# Patient Record
Sex: FEMALE | Race: BLACK OR AFRICAN AMERICAN | NOT HISPANIC OR LATINO | URBAN - METROPOLITAN AREA
[De-identification: names, ages, dates, MRNs, and addresses within clinical notes are randomized per-mention and may not be internally consistent; named-entity substitution may affect disease eponyms.]

---

## 2019-12-16 ENCOUNTER — OUTPATIENT (OUTPATIENT)
Dept: OUTPATIENT SERVICES | Facility: HOSPITAL | Age: 29
LOS: 1 days | End: 2019-12-16
Payer: MEDICAID

## 2019-12-16 ENCOUNTER — EMERGENCY (EMERGENCY)
Facility: HOSPITAL | Age: 29
LOS: 1 days | Discharge: STILL ADMITTED OR ADMINISTRATIVE | End: 2019-12-16
Admitting: EMERGENCY MEDICINE

## 2019-12-16 ENCOUNTER — ASOB RESULT (OUTPATIENT)
Age: 29
End: 2019-12-16

## 2019-12-16 ENCOUNTER — APPOINTMENT (OUTPATIENT)
Dept: ANTEPARTUM | Facility: CLINIC | Age: 29
End: 2019-12-16
Payer: MEDICAID

## 2019-12-16 ENCOUNTER — TRANSCRIPTION ENCOUNTER (OUTPATIENT)
Age: 29
End: 2019-12-16

## 2019-12-16 ENCOUNTER — OUTPATIENT (OUTPATIENT)
Dept: OUTPATIENT SERVICES | Facility: HOSPITAL | Age: 29
LOS: 1 days | End: 2019-12-16

## 2019-12-16 VITALS
RESPIRATION RATE: 16 BRPM | OXYGEN SATURATION: 100 % | TEMPERATURE: 98 F | DIASTOLIC BLOOD PRESSURE: 73 MMHG | SYSTOLIC BLOOD PRESSURE: 129 MMHG | HEART RATE: 116 BPM

## 2019-12-16 VITALS — SYSTOLIC BLOOD PRESSURE: 133 MMHG | HEART RATE: 120 BPM | DIASTOLIC BLOOD PRESSURE: 80 MMHG

## 2019-12-16 VITALS — OXYGEN SATURATION: 100 % | HEART RATE: 115 BPM

## 2019-12-16 DIAGNOSIS — Z3A.00 WEEKS OF GESTATION OF PREGNANCY NOT SPECIFIED: ICD-10-CM

## 2019-12-16 DIAGNOSIS — O26.899 OTHER SPECIFIED PREGNANCY RELATED CONDITIONS, UNSPECIFIED TRIMESTER: ICD-10-CM

## 2019-12-16 PROBLEM — Z00.00 ENCOUNTER FOR PREVENTIVE HEALTH EXAMINATION: Status: ACTIVE | Noted: 2019-12-16

## 2019-12-16 LAB
ALBUMIN SERPL ELPH-MCNC: 3.6 G/DL — SIGNIFICANT CHANGE UP (ref 3.3–5)
ALP SERPL-CCNC: 195 U/L — HIGH (ref 40–120)
ALT FLD-CCNC: 15 U/L — SIGNIFICANT CHANGE UP (ref 4–33)
AMPHET UR-MCNC: NEGATIVE — SIGNIFICANT CHANGE UP
ANION GAP SERPL CALC-SCNC: 12 MMO/L — SIGNIFICANT CHANGE UP (ref 7–14)
ANISOCYTOSIS BLD QL: SLIGHT — SIGNIFICANT CHANGE UP
APPEARANCE UR: CLEAR — SIGNIFICANT CHANGE UP
APTT BLD: 25.5 SEC — LOW (ref 27.5–36.3)
AST SERPL-CCNC: 19 U/L — SIGNIFICANT CHANGE UP (ref 4–32)
BACTERIA # UR AUTO: NEGATIVE — SIGNIFICANT CHANGE UP
BARBITURATES UR SCN-MCNC: NEGATIVE — SIGNIFICANT CHANGE UP
BASOPHILS # BLD AUTO: 0.1 K/UL — SIGNIFICANT CHANGE UP (ref 0–0.2)
BASOPHILS NFR BLD AUTO: 0.9 % — SIGNIFICANT CHANGE UP (ref 0–2)
BASOPHILS NFR SPEC: 0 % — SIGNIFICANT CHANGE UP (ref 0–2)
BENZODIAZ UR-MCNC: NEGATIVE — SIGNIFICANT CHANGE UP
BILIRUB SERPL-MCNC: 0.2 MG/DL — SIGNIFICANT CHANGE UP (ref 0.2–1.2)
BILIRUB UR-MCNC: NEGATIVE — SIGNIFICANT CHANGE UP
BLASTS # FLD: 0 % — SIGNIFICANT CHANGE UP (ref 0–0)
BLD GP AB SCN SERPL QL: NEGATIVE — SIGNIFICANT CHANGE UP
BLOOD UR QL VISUAL: NEGATIVE — SIGNIFICANT CHANGE UP
BUN SERPL-MCNC: 5 MG/DL — LOW (ref 7–23)
CALCIUM SERPL-MCNC: 9.8 MG/DL — SIGNIFICANT CHANGE UP (ref 8.4–10.5)
CANNABINOIDS UR-MCNC: NEGATIVE — SIGNIFICANT CHANGE UP
CHLORIDE SERPL-SCNC: 104 MMOL/L — SIGNIFICANT CHANGE UP (ref 98–107)
CO2 SERPL-SCNC: 20 MMOL/L — LOW (ref 22–31)
COCAINE METAB.OTHER UR-MCNC: NEGATIVE — SIGNIFICANT CHANGE UP
COLOR SPEC: COLORLESS — SIGNIFICANT CHANGE UP
CREAT ?TM UR-MCNC: 33 MG/DL — SIGNIFICANT CHANGE UP
CREAT SERPL-MCNC: 0.6 MG/DL — SIGNIFICANT CHANGE UP (ref 0.5–1.3)
EOSINOPHIL # BLD AUTO: 0.2 K/UL — SIGNIFICANT CHANGE UP (ref 0–0.5)
EOSINOPHIL NFR BLD AUTO: 1.7 % — SIGNIFICANT CHANGE UP (ref 0–6)
EOSINOPHIL NFR FLD: 2.7 % — SIGNIFICANT CHANGE UP (ref 0–6)
FIBRINOGEN PPP-MCNC: 503.3 MG/DL — SIGNIFICANT CHANGE UP (ref 350–510)
GIANT PLATELETS BLD QL SMEAR: PRESENT — SIGNIFICANT CHANGE UP
GLUCOSE SERPL-MCNC: 97 MG/DL — SIGNIFICANT CHANGE UP (ref 70–99)
GLUCOSE UR-MCNC: NEGATIVE — SIGNIFICANT CHANGE UP
HAV IGM SER-ACNC: NONREACTIVE — SIGNIFICANT CHANGE UP
HBV CORE IGM SER-ACNC: NONREACTIVE — SIGNIFICANT CHANGE UP
HBV SURFACE AG SER-ACNC: NONREACTIVE — SIGNIFICANT CHANGE UP
HCT VFR BLD CALC: 36.9 % — SIGNIFICANT CHANGE UP (ref 34.5–45)
HCV AB S/CO SERPL IA: 0.12 S/CO — SIGNIFICANT CHANGE UP (ref 0–0.99)
HCV AB SERPL-IMP: SIGNIFICANT CHANGE UP
HGB BLD-MCNC: 11.7 G/DL — SIGNIFICANT CHANGE UP (ref 11.5–15.5)
HIV COMBO RESULT: SIGNIFICANT CHANGE UP
HIV1+2 AB SPEC QL: SIGNIFICANT CHANGE UP
HYALINE CASTS # UR AUTO: NEGATIVE — SIGNIFICANT CHANGE UP
IMM GRANULOCYTES NFR BLD AUTO: 5.2 % — HIGH (ref 0–1.5)
INR BLD: 1.09 — SIGNIFICANT CHANGE UP (ref 0.88–1.17)
KETONES UR-MCNC: NEGATIVE — SIGNIFICANT CHANGE UP
LDH SERPL L TO P-CCNC: 202 U/L — SIGNIFICANT CHANGE UP (ref 135–225)
LEUKOCYTE ESTERASE UR-ACNC: SIGNIFICANT CHANGE UP
LYMPHOCYTES # BLD AUTO: 2.66 K/UL — SIGNIFICANT CHANGE UP (ref 1–3.3)
LYMPHOCYTES # BLD AUTO: 22.9 % — SIGNIFICANT CHANGE UP (ref 13–44)
LYMPHOCYTES NFR SPEC AUTO: 10.7 % — LOW (ref 13–44)
MACROCYTES BLD QL: SLIGHT — SIGNIFICANT CHANGE UP
MCHC RBC-ENTMCNC: 28.3 PG — SIGNIFICANT CHANGE UP (ref 27–34)
MCHC RBC-ENTMCNC: 31.7 % — LOW (ref 32–36)
MCV RBC AUTO: 89.1 FL — SIGNIFICANT CHANGE UP (ref 80–100)
METAMYELOCYTES # FLD: 0 % — SIGNIFICANT CHANGE UP (ref 0–1)
METHADONE UR-MCNC: NEGATIVE — SIGNIFICANT CHANGE UP
MONOCYTES # BLD AUTO: 0.99 K/UL — HIGH (ref 0–0.9)
MONOCYTES NFR BLD AUTO: 8.5 % — SIGNIFICANT CHANGE UP (ref 2–14)
MONOCYTES NFR BLD: 5.4 % — SIGNIFICANT CHANGE UP (ref 2–9)
MYELOCYTES NFR BLD: 0 % — SIGNIFICANT CHANGE UP (ref 0–0)
NEUTROPHIL AB SER-ACNC: 72.3 % — SIGNIFICANT CHANGE UP (ref 43–77)
NEUTROPHILS # BLD AUTO: 7.08 K/UL — SIGNIFICANT CHANGE UP (ref 1.8–7.4)
NEUTROPHILS NFR BLD AUTO: 60.8 % — SIGNIFICANT CHANGE UP (ref 43–77)
NEUTS BAND # BLD: 0.9 % — SIGNIFICANT CHANGE UP (ref 0–6)
NITRITE UR-MCNC: NEGATIVE — SIGNIFICANT CHANGE UP
NRBC # FLD: 0 K/UL — SIGNIFICANT CHANGE UP (ref 0–0)
OPIATES UR-MCNC: NEGATIVE — SIGNIFICANT CHANGE UP
OTHER - HEMATOLOGY %: 0 — SIGNIFICANT CHANGE UP
OVALOCYTES BLD QL SMEAR: SLIGHT — SIGNIFICANT CHANGE UP
OXYCODONE UR-MCNC: NEGATIVE — SIGNIFICANT CHANGE UP
PCP UR-MCNC: NEGATIVE — SIGNIFICANT CHANGE UP
PH UR: 7 — SIGNIFICANT CHANGE UP (ref 5–8)
PLATELET # BLD AUTO: 152 K/UL — SIGNIFICANT CHANGE UP (ref 150–400)
PLATELET COUNT - ESTIMATE: NORMAL — SIGNIFICANT CHANGE UP
PMV BLD: 12.6 FL — SIGNIFICANT CHANGE UP (ref 7–13)
POIKILOCYTOSIS BLD QL AUTO: SLIGHT — SIGNIFICANT CHANGE UP
POTASSIUM SERPL-MCNC: 4.4 MMOL/L — SIGNIFICANT CHANGE UP (ref 3.5–5.3)
POTASSIUM SERPL-SCNC: 4.4 MMOL/L — SIGNIFICANT CHANGE UP (ref 3.5–5.3)
PROMYELOCYTES # FLD: 0 % — SIGNIFICANT CHANGE UP (ref 0–0)
PROT SERPL-MCNC: 6.8 G/DL — SIGNIFICANT CHANGE UP (ref 6–8.3)
PROT UR-MCNC: 6.9 MG/DL — SIGNIFICANT CHANGE UP
PROT UR-MCNC: NEGATIVE — SIGNIFICANT CHANGE UP
PROTHROM AB SERPL-ACNC: 12.1 SEC — SIGNIFICANT CHANGE UP (ref 9.8–13.1)
RBC # BLD: 4.14 M/UL — SIGNIFICANT CHANGE UP (ref 3.8–5.2)
RBC # FLD: 13.3 % — SIGNIFICANT CHANGE UP (ref 10.3–14.5)
RBC CASTS # UR COMP ASSIST: SIGNIFICANT CHANGE UP (ref 0–?)
RH IG SCN BLD-IMP: NEGATIVE — SIGNIFICANT CHANGE UP
RH IG SCN BLD-IMP: NEGATIVE — SIGNIFICANT CHANGE UP
RUBV IGG SER-ACNC: 2.2 INDEX — SIGNIFICANT CHANGE UP
RUBV IGG SER-IMP: POSITIVE — SIGNIFICANT CHANGE UP
SMUDGE CELLS # BLD: PRESENT — SIGNIFICANT CHANGE UP
SODIUM SERPL-SCNC: 136 MMOL/L — SIGNIFICANT CHANGE UP (ref 135–145)
SP GR SPEC: 1.01 — SIGNIFICANT CHANGE UP (ref 1–1.04)
SQUAMOUS # UR AUTO: SIGNIFICANT CHANGE UP
T PALLIDUM AB TITR SER: NEGATIVE — SIGNIFICANT CHANGE UP
URATE SERPL-MCNC: 3.6 MG/DL — SIGNIFICANT CHANGE UP (ref 2.5–7)
UROBILINOGEN FLD QL: NORMAL — SIGNIFICANT CHANGE UP
VARIANT LYMPHS # BLD: 8 % — SIGNIFICANT CHANGE UP
WBC # BLD: 11.63 K/UL — HIGH (ref 3.8–10.5)
WBC # FLD AUTO: 11.63 K/UL — HIGH (ref 3.8–10.5)
WBC UR QL: SIGNIFICANT CHANGE UP (ref 0–?)

## 2019-12-16 PROCEDURE — 76811 OB US DETAILED SNGL FETUS: CPT | Mod: 26

## 2019-12-16 PROCEDURE — 76819 FETAL BIOPHYS PROFIL W/O NST: CPT | Mod: 26

## 2019-12-16 PROCEDURE — 93010 ELECTROCARDIOGRAM REPORT: CPT

## 2019-12-16 RX ORDER — METOCLOPRAMIDE HCL 10 MG
10 TABLET ORAL ONCE
Refills: 0 | Status: DISCONTINUED | OUTPATIENT
Start: 2019-12-16 | End: 2019-12-16

## 2019-12-16 RX ORDER — SODIUM CHLORIDE 9 MG/ML
1000 INJECTION, SOLUTION INTRAVENOUS ONCE
Refills: 0 | Status: COMPLETED | OUTPATIENT
Start: 2019-12-16 | End: 2019-12-16

## 2019-12-16 RX ORDER — ACETAMINOPHEN 500 MG
975 TABLET ORAL ONCE
Refills: 0 | Status: COMPLETED | OUTPATIENT
Start: 2019-12-16 | End: 2019-12-16

## 2019-12-16 RX ADMIN — Medication 975 MILLIGRAM(S): at 04:03

## 2019-12-16 RX ADMIN — SODIUM CHLORIDE 2000 MILLILITER(S): 9 INJECTION, SOLUTION INTRAVENOUS at 04:19

## 2019-12-16 RX ADMIN — Medication 975 MILLIGRAM(S): at 07:28

## 2019-12-16 NOTE — OB PROVIDER TRIAGE NOTE - NSHPPHYSICALEXAM_GEN_ALL_CORE
30 y/o AA female, para 1001 @ 39+2 wks gestation, SIUP.  Gen: NAD, A&O x3  Lungs: CTA  Cardiac: Sinus tachycardia otherwise normal rhythm  DTRs 2+ 30 y/o AA female, para 1001 @ 39+2 wks gestation, SIUP.  Gen: NAD, A&O x3  Lungs: CTA  Cardiac: Sinus tachycardia otherwise normal rhythm  DTRs 2+  VS--BP: 128/76, P 107, RR 18, T 37.4, pain 3/10.  BP trends: 133/80, 128/76, 115/60, 113/56  Cat 1 tracin bpm, moderate variability, +accels, no decels  [place]: Irreg. q 2-9 min.  SVE: 0/20/-3, intact membranes  GBS swab obtained and sent.  Limited TAS: SLIUP, Cephalic, posterior placenta, BPP 8/8, SHELBIE 13.86 cm, EFW AGA-3370g  Plan:  PEC labs  Prenatal panel  GBS 28 y/o AA female, para 1001 @ 39+2 wks gestation, SIUP.  Gen: NAD, A&O x3  Lungs: CTA  Cardiac: Sinus tachycardia otherwise normal rhythm  DTRs 2+  VS--BP: 128/76, P 107, RR 18, T 37.4, pain 3/10.  BP trends: 133/80, 128/76, 115/60, 113/56  Cat 1 tracin bpm, moderate variability, +accels, no decels  [place]: Irreg. q 2-9 min.  SVE: 0/20/-3, intact membranes  GBS swab obtained and sent.  Limited TAS: SLIUP, Cephalic, posterior placenta, BPP 8/8, SHELBIE 13.86 cm, EFW AGA-3370g  Plan:  PEC labs  EKG   Prenatal panel  GBS  U. Tox 30 y/o AA female, para 1001 @ 39+2 wks gestation, SIUP.  Gen: NAD, A&O x3  Lungs: CTA  Cardiac: Sinus tachycardia otherwise normal rhythm  DTRs 2+  VS--BP: 128/76, P 107, RR 18, T 37.4, pain 3/10.  BP trends: 133/80, 128/76, 115/60, 113/56  Cat 1 tracin bpm, moderate variability, +accels, no decels  [place]: Irreg. q 2-9 min.  SVE: 0/20/-3, intact membranes  GBS swab obtained and sent.  Limited TAS: SLIUP, Cephalic, posterior placenta, BPP 8/8, SHELBIE 13.86 cm, EFW AGA-3370g  Plan:  PEC labs  EKG   Prenatal panel  GBS  U. Tox    headache 9/10, now requesting pain meds @ 9947   tylenol 975  IV fluid bolus  s/p EKG : normal sinus tachycardia  TOCO: ctx irregular  NST: , +accels, -decels, moderate variability   Case Discussed with Dr. Dang    @ 3618  s/p tylenol  s/p IV bolus  CAt 1 tracing  TOCo: ctx q 4-14 min, mild  Headache 3/10  VE: L/C/P, intact  Dr. Dang at bedside

## 2019-12-16 NOTE — DISCHARGE NOTE ANTEPARTUM - PROVIDER TOKENS
FREE:[LAST:[[facility]],PHONE:[phone]],FAX:[(   [phone]    -],ADDRESS:[[address] [place] [address] [facility]  [address] [place] [address]]]

## 2019-12-16 NOTE — OB PROVIDER TRIAGE NOTE - NSOBPROVIDERNOTE_OBGYN_ALL_OB_FT
s/p EKG : normal sinus tachycardia  TOCO: ctx irregular  NST: , +accels, -decels, moderate variability headache 5/10, now requesting pain meds @ 2662   s/p EKG : normal sinus tachycardia  TOCO: ctx irregular  NST: , +accels, -decels, moderate variability headache 9/10, now requesting pain meds @ 9524   s/p EKG : normal sinus tachycardia  TOCO: ctx irregular  NST: , +accels, -decels, moderate variability headache 9/10, now requesting pain meds @ 4582   tylenol 975  IV fluid bolus  s/p EKG : normal sinus tachycardia  TOCO: ctx irregular  NST: , +accels, -decels, moderate variability   Case Discussed with Dr. Dang headache 9/10, now requesting pain meds @ 8979   tylenol 975  IV fluid bolus  s/p EKG : normal sinus tachycardia  TOCO: ctx irregular  NST: , +accels, -decels, moderate variability   Case Discussed with Dr. Dang    @ 5536  s/p tylenol  s/p IV bolus  CAt 1 tracing  TOCo: ctx q 4-14 min, mild  Headache 3/10  VE: L/C/P, intact  Dr. Dang at bedside headache 9/10, now requesting pain meds @ 3533   tylenol 975  IV fluid bolus  s/p EKG : normal sinus tachycardia  TOCO: ctx irregular  NST: , +accels, -decels, moderate variability   Case Discussed with Dr. Dang    @ 7608  s/p tylenol  s/p IV bolus  CAt 1 tracing  TOCo: ctx q 4-14 min, mild  Headache 3/10  VE: L/C/P, intact  Dr. Dang at bedside    @ 0540  /77  Patient now reporting hx of persistent headache for past 3 wks  Patient admitted for observation  MFM consult in AM  rx Reglan ordered  Discussed with Dr. Dang/Ravindra/Krystle

## 2019-12-16 NOTE — ED ADULT TRIAGE NOTE - CHIEF COMPLAINT QUOTE
pt 39 wks pregnant with c/o contractions every 20-30 mins. pt also c/o headache and hands numb. BARB Dec 21st, pt denies any vaginal bleeding pt states . SPoke w/ L&D pt to been seen in L&D.

## 2019-12-16 NOTE — DISCHARGE NOTE ANTEPARTUM - MEDICATION SUMMARY - MEDICATIONS TO TAKE
I will START or STAY ON the medications listed below when I get home from the hospital:    Prena1 oral capsule  -- 1 cap(s) by mouth once a day  -- Indication: For Other pregnancy-related conditions, antepartum

## 2019-12-16 NOTE — DISCHARGE NOTE ANTEPARTUM - PLAN OF CARE
r/o PEC - Follow up with OB clinic at [facility] on Wednesday/ Thursday (12/18-12/19) they will call you to set up appointment date and time  - Continue 24 hour urine collection and bring to follow up appointment at [facility] on Wednesday/Thursday  - Monitor Blood Pressure at home; if BP >140/90 please call clinic [phone]/ return   - Come to hospital with headache, visual changes, RUQ pain, N/V, or elevated BPs >140/90

## 2019-12-16 NOTE — DISCHARGE NOTE ANTEPARTUM - CARE PROVIDER_API CALL
[facility],   3rd floor Oncology Building  [address] [place] [address]  Phone: [phone]  Fax: (   )    -  Follow Up Time:

## 2019-12-16 NOTE — OB PROVIDER TRIAGE NOTE - HISTORY OF PRESENT ILLNESS
[facility] Default patient.  Pt visiting from [facility]. Reports PNC up until November. [place], and wishes to deliver at this facility.  30 y/o AA female, para 1001 @ 39+2 wks gestation, SIUP.  Presents with c/o throbbing right temporal headache x2 days. Pt denies any change in vision (blurry, scotoma), SOB, N&V, neck pain, fever, chills. Pt denies any elevated blood pressures this pregnancy or prior to pregnancy. Pt also with c/o irregular ctx q 15-20 min.  Pt endorses strong fetal movement, no leakage of fluid, no vaginal bleeding. Unknown GBS. Per prenatal card patient is O-negative blood, pt denies receiving Rhogam this pregnancy.     Allergies: NKDA  Meds: Prenatal vitamins  OBgynHx    2010-Uncomplicated  @ 40+6 wks. Female, 6.2#  Pt denies any h/o: Abn. PAP, ovarian cysts, fibroids, STDs/STIs  PMH: Denies  PSH: Denies  PSY: Denies  EtOH/smoke/Recreational substance use: denies  FH: Not relevant to HPI  H/W/BMI: 60"/174#/34

## 2019-12-16 NOTE — CHART NOTE - NSCHARTNOTEFT_GEN_A_CORE
Attending Note     Went to evaluate patient    at 39 + weeks who received in PNC in [place] (last appt as per in 2019, as per clinic in 10/2019) came in for headache x 2 days   did not take any medications at home  also  reports worsening swelling in hands that fingers are numb  no vision changes, RUQ or epigastric pain   also reporting intermittent painful contractions   SVE unchanged over 2 exams closed  Received tylenol for headache 9/10 and now 3/10 dull headache  HELLP labs WNL   Will give reglan, pt has had one bp 140/77   IF pt  has persistent headache or bps meet criteria for gestational HTN will induce  In addition, pt needs formal ATU scan in AM (has hx of 6 pounds delivery at 40 + weeks, as per pt concern about growth of this baby)       Della Rose Md MSc

## 2019-12-16 NOTE — DISCHARGE NOTE ANTEPARTUM - PATIENT PORTAL LINK FT
You can access the FollowMyHealth Patient Portal offered by [facility] by registering at the following website: [email or web address]. By joining [employer]’s FollowMyHealth portal, you will also be able to view your health information using other applications (apps) compatible with our system.

## 2019-12-16 NOTE — CHART NOTE - NSCHARTNOTEFT_GEN_A_CORE
Patient is 29y  P1 at 39w3d who presented to triage with complaint of HA and irregular contractions.     Initially GARCÍA was throbbing 4/10 right temporal HA for which patient declined medication. While in triage patient had worsening of HA to 9/10 severity which is now a bilateral frontal HA. Patient denies h/o headaches/migraines, vision changes, nausea/vomiting, epigastric/RUQ pain, elevated BPs in the pregnancy or prior to pregnancy.     While in triage patient found to have tachycardia up to 127bpm. EKG completed demonstrated sinus tachycardia. Patient denies CP, palpitations, racing heart, sob, dizziness/lightheadedness, fever/chills, sick contacts, dysuria, cough or URI symptoms.     Patient also complains of increasing contractions which are now every 5-10min and have increased in severity to 4-5/10. +FM. Denies LOF, VB. prenatal course uncomplicated per patient and she had regular prenatal care in [place]. Patient has not established care in the  and plans to pursue care at [facility] in order to deliver at [facility]. Per pt she has h/o uncomplicated FT VD '10, 6#2      Vital Signs Last 24 Hrs  T(C): 37.4 (16 Dec 2019 02:08), Max: 37.4 (16 Dec 2019 02:08)  T(F): 99.3 (16 Dec 2019 02:08), Max: 99.3 (16 Dec 2019 02:08)  HR: 115 (16 Dec 2019 04:14) (101 - 127)  BP: 134/72 (16 Dec 2019 04:02) (113/56 - 134/72)  BP(mean): --  RR: 18 (16 Dec 2019 02:08) (16 - 18)  SpO2: 100% (16 Dec 2019 04:14) (99% - 100%)    I&O's Detail      PE:  Gen: Appears comfortable with mild distress from pain with contractions   CV: tachycardic, s1s2  Pulm: CTA b/l  Abd: Soft, gravid, non-tender, no rebound  Ext: NT bilateral; negative Grady's bilaterally  Neuro: CN 2-12 grossly intact       Labs:                        11.7   11.63 )-----------( 152      ( 16 Dec 2019 02:45 )             36.9     12-16    136  |  104  |  5<L>  ----------------------------<  97  4.4   |  20<L>  |  0.60    Ca    9.8      16 Dec 2019 02:45    TPro  6.8  /  Alb  3.6  /  TBili  0.2  /  DBili  x   /  AST  19  /  ALT  15  /  AlkPhos  195<H>  12-16    PT/INR - ( 16 Dec 2019 02:45 )   PT: 12.1 SEC;   INR: 1.09          PTT - ( 16 Dec 2019 02:45 )  PTT:25.5 SEC    Fibrinogen: Fibrinogen Assay: 503.3 mg/dL ( @ 02:45)      Lactate:     MEDICATIONS  (STANDING):  lactated ringers Bolus 1000 milliLiter(s) IV Bolus once      Assessment/Plan: 30yo  at 39w3d p/w HA r/o preeclampsia, tachycardia, and contractions r/o labor.  #HA   -Tylenol for symptom relief  -no elevated BPs, HELLP labs wnl, PC wnl  -reevaluate for symptom relief after Tylenol     #Tachycardia   -EKG: sinus tachycardia   -pt asymptomatic and denies h/o CV disease. no s/s of infection  -IV hydration with 1L LR    #r/o labor   -closed on vaginal exam  -c/w toco/EFM and recheck to evaluate for cervical change     d/w Dr. Reza Dang PGY3  -------------------------------------------------------------------------------------------------------------

## 2019-12-16 NOTE — OB PROVIDER H&P - NSHPPHYSICALEXAM_GEN_ALL_CORE
30 y/o AA female, para 1001 @ 39+2 wks gestation, SIUP.  Gen: NAD, A&O x3  Lungs: CTA  Cardiac: Sinus tachycardia otherwise normal rhythm  DTRs 2+  VS--BP: 128/76, P 107, RR 18, T 37.4, pain 3/10.  BP trends: 133/80, 128/76, 115/60, 113/56  Cat 1 tracin bpm, moderate variability, +accels, no decels  [place]: Irreg. q 2-9 min.  SVE: 0/20/-3, intact membranes  GBS swab obtained and sent.  Limited TAS: SLIUP, Cephalic, posterior placenta, BPP 8/8, SHELBIE 13.86 cm, EFW AGA-3370g  Plan:  PEC labs  EKG   Prenatal panel  GBS  U. Tox    headache 9/10, now requesting pain meds @ 9671   tylenol 975  IV fluid bolus  s/p EKG : normal sinus tachycardia  TOCO: ctx irregular  NST: , +accels, -decels, moderate variability   Case Discussed with Dr. Dang    @ 5945  s/p tylenol  s/p IV bolus  CAt 1 tracing  TOCo: ctx q 4-14 min, mild  Headache 3/10  VE: L/C/P, intact  Dr. Dang at bedside

## 2019-12-16 NOTE — OB PROVIDER TRIAGE NOTE - NSHPLABSRESULTS_GEN_ALL_CORE
11.7   11.63 )-----------( 152      ( 16 Dec 2019 02:45 )             36.9         136  |  104  |  5<L>  ----------------------------<  97  4.4   |  20<L>  |  0.60    Ca    9.8      16 Dec 2019 02:45    TPro  6.8  /  Alb  3.6  /  TBili  0.2  /  DBili  x   /  AST  19  /  ALT  15  /  AlkPhos  195<H>            Urinalysis Basic - ( 16 Dec 2019 02:45 )    Color: COLORLESS / Appearance: CLEAR / S.007 / pH: 7.0  Gluc: NEGATIVE / Ketone: NEGATIVE  / Bili: NEGATIVE / Urobili: NORMAL   Blood: NEGATIVE / Protein: NEGATIVE / Nitrite: NEGATIVE   Leuk Esterase: SMALL / RBC: 0-2 / WBC 3-5   Sq Epi: OCC / Non Sq Epi: x / Bacteria: NEGATIVE      PT/INR - ( 16 Dec 2019 02:45 )   PT: 12.1 SEC;   INR: 1.09          PTT - ( 16 Dec 2019 02:45 )  PTT:25.5 SEC      protein  Random Urine: 6.9 mg/dL (19 @ 02:45)    P/c ratio : 0.2

## 2019-12-16 NOTE — DISCHARGE NOTE ANTEPARTUM - CARE PLAN
Principal Discharge DX:	Headache  Goal:	r/o PEC  Assessment and plan of treatment:	- Follow up with OB clinic at [facility] on Wednesday/ Thursday (12/18-12/19) they will call you to set up appointment date and time  - Continue 24 hour urine collection and bring to follow up appointment at [facility] on Wednesday/Thursday  - Monitor Blood Pressure at home; if BP >140/90 please call clinic [phone]/ return   - Come to hospital with headache, visual changes, RUQ pain, N/V, or elevated BPs >140/90

## 2019-12-16 NOTE — OB PROVIDER H&P - HISTORY OF PRESENT ILLNESS
[facility] Default patient.  Pt visiting from [facility]. Reports PNC up until November. [place], and wishes to deliver at this facility.  28 y/o AA female, para 1001 @ 39+2 wks gestation, SIUP.  Presents with c/o throbbing right temporal headache x2 days. Pt denies any change in vision (blurry, scotoma), SOB, N&V, neck pain, fever, chills. Pt denies any elevated blood pressures this pregnancy or prior to pregnancy. Pt also with c/o irregular ctx q 15-20 min.  Pt endorses strong fetal movement, no leakage of fluid, no vaginal bleeding. Unknown GBS. Per prenatal card patient is O-negative blood, pt denies receiving Rhogam this pregnancy.     Allergies: NKDA  Meds: Prenatal vitamins  OBgynHx    2010-Uncomplicated  @ 40+6 wks. Female, 6.2#  Pt denies any h/o: Abn. PAP, ovarian cysts, fibroids, STDs/STIs  PMH: Denies  PSH: Denies  PSY: Denies  EtOH/smoke/Recreational substance use: denies  FH: Not relevant to HPI  H/W/BMI: 60"/174#/34

## 2019-12-16 NOTE — DISCHARGE NOTE ANTEPARTUM - HOSPITAL COURSE
28 y/o  at 39+2 wks gestation, SIUP. Presents with c/o throbbing right temporal headache x2 days. Pt denies any change in vision (blurry, scotoma), SOB, N&V, neck pain, fever, chills. Pt denies any elevated blood pressures this pregnancy or prior to pregnancy. Pt also with c/o irregular ctx q 15-20 min. Pt endorses strong fetal movement, no leakage of fluid, no vaginal bleeding. Patient was monitored found to have a closed cervix and NL range BPs, HELLP labs WNL, PC ratio 0.2. Patient is stable for discharge and to follow up outpatient with [facility]; email sent for appointment on Wednesday / Thursday. Patient is to continue BP monitoring at home and start 24 hour urine protein collection to bring to clinic appointment.

## 2019-12-16 NOTE — OB PROVIDER H&P - ASSESSMENT
28 y/o AA female, para 1001 @ 39+2 wks gestation, SIUP.  Gen: NAD, A&O x3  Lungs: CTA  Cardiac: Sinus tachycardia otherwise normal rhythm  DTRs 2+  VS--BP: 128/76, P 107, RR 18, T 37.4, pain 3/10.  BP trends: 133/80, 128/76, 115/60, 113/56  Cat 1 tracin bpm, moderate variability, +accels, no decels  [place]: Irreg. q 2-9 min.  SVE: 0/20/-3, intact membranes  GBS swab obtained and sent.  Limited TAS: SLIUP, Cephalic, posterior placenta, BPP 8/8, SHELBIE 13.86 cm, EFW AGA-3370g  Plan:  PEC labs  EKG   Prenatal panel  GBS  U. Tox    headache 9/10, now requesting pain meds @ 0357   tylenol 975  IV fluid bolus  s/p EKG : normal sinus tachycardia  TOCO: ctx irregular  NST: , +accels, -decels, moderate variability   Case Discussed with Dr. Dang    @ 0518  s/p tylenol  s/p IV bolus  CAt 1 tracing  TOCo: ctx q 4-14 min, mild  Headache 3/10  VE: L/C/P, intact  Dr. Dang at bedside    @ 0540  /77  Patient now reporting hx of persistent headache for past 3 wks  Patient admitted for observation  MFM consult in AM  rx Reglan ordered  Discussed with Dr. Dang/Ravindra/Krystle

## 2019-12-16 NOTE — OB PROVIDER TRIAGE NOTE - NS_OBGYNHISTORY_OBGYN_ALL_OB_FT
2010-Uncomplicated  @ 40+6 wks. Female, 6.2#  Pt denies any h/o: Abn. PAP, ovarian cysts, fibroids, STDs/STIs

## 2019-12-17 LAB
C TRACH RRNA SPEC QL NAA+PROBE: SIGNIFICANT CHANGE UP
N GONORRHOEA RRNA SPEC QL NAA+PROBE: SIGNIFICANT CHANGE UP
SPECIMEN SOURCE: SIGNIFICANT CHANGE UP

## 2019-12-18 DIAGNOSIS — Z04.9 ENCOUNTER FOR EXAMINATION AND OBSERVATION FOR UNSPECIFIED REASON: ICD-10-CM

## 2019-12-18 LAB — GP B STREP GENITAL QL CULT: SIGNIFICANT CHANGE UP

## 2019-12-27 ENCOUNTER — APPOINTMENT (OUTPATIENT)
Dept: OBGYN | Facility: CLINIC | Age: 29
End: 2019-12-27

## 2019-12-27 ENCOUNTER — RESULT REVIEW (OUTPATIENT)
Age: 29
End: 2019-12-27

## 2019-12-27 ENCOUNTER — INPATIENT (INPATIENT)
Facility: HOSPITAL | Age: 29
LOS: 3 days | Discharge: HOME | End: 2019-12-31
Attending: OBSTETRICS & GYNECOLOGY | Admitting: OBSTETRICS & GYNECOLOGY
Payer: MEDICAID

## 2019-12-27 ENCOUNTER — TRANSCRIPTION ENCOUNTER (OUTPATIENT)
Age: 29
End: 2019-12-27

## 2019-12-27 VITALS
WEIGHT: 181 LBS | BODY MASS INDEX: 35.53 KG/M2 | RESPIRATION RATE: 18 BRPM | OXYGEN SATURATION: 99 % | TEMPERATURE: 98.4 F | HEART RATE: 114 BPM | HEIGHT: 60 IN | DIASTOLIC BLOOD PRESSURE: 71 MMHG | SYSTOLIC BLOOD PRESSURE: 117 MMHG

## 2019-12-27 VITALS — WEIGHT: 152.12 LBS | HEIGHT: 60 IN

## 2019-12-27 DIAGNOSIS — Z78.9 OTHER SPECIFIED HEALTH STATUS: ICD-10-CM

## 2019-12-27 DIAGNOSIS — Z34.80 ENCOUNTER FOR SUPERVISION OF OTHER NORMAL PREGNANCY, UNSPECIFIED TRIMESTER: ICD-10-CM

## 2019-12-27 DIAGNOSIS — Z82.5 FAMILY HISTORY OF ASTHMA AND OTHER CHRONIC LOWER RESPIRATORY DISEASES: ICD-10-CM

## 2019-12-27 DIAGNOSIS — Z87.09 PERSONAL HISTORY OF OTHER DISEASES OF THE RESPIRATORY SYSTEM: ICD-10-CM

## 2019-12-27 DIAGNOSIS — Z3A.00 WEEKS OF GESTATION OF PREGNANCY NOT SPECIFIED: ICD-10-CM

## 2019-12-27 DIAGNOSIS — Z80.41 FAMILY HISTORY OF MALIGNANT NEOPLASM OF OVARY: ICD-10-CM

## 2019-12-27 DIAGNOSIS — O26.899 OTHER SPECIFIED PREGNANCY RELATED CONDITIONS, UNSPECIFIED TRIMESTER: ICD-10-CM

## 2019-12-27 LAB
ABO RH CONFIRMATION: SIGNIFICANT CHANGE UP
ALLERGY+IMMUNOLOGY DIAG STUDY NOTE: SIGNIFICANT CHANGE UP
APTT BLD: 25.1 SEC — LOW (ref 27.5–36.3)
BASOPHILS # BLD AUTO: 0.05 K/UL — SIGNIFICANT CHANGE UP (ref 0–0.2)
BASOPHILS NFR BLD AUTO: 0.5 % — SIGNIFICANT CHANGE UP (ref 0–2)
BLD GP AB SCN SERPL QL: SIGNIFICANT CHANGE UP
EOSINOPHIL # BLD AUTO: 0.07 K/UL — SIGNIFICANT CHANGE UP (ref 0–0.5)
EOSINOPHIL NFR BLD AUTO: 0.7 % — SIGNIFICANT CHANGE UP (ref 0–6)
FIBRINOGEN PPP-MCNC: 553 MG/DL — HIGH (ref 350–510)
HCT VFR BLD CALC: 39.1 % — SIGNIFICANT CHANGE UP (ref 34.5–45)
HGB BLD-MCNC: 12.5 G/DL — SIGNIFICANT CHANGE UP (ref 11.5–15.5)
IMM GRANULOCYTES NFR BLD AUTO: 3.6 % — HIGH (ref 0–1.5)
INR BLD: 1.11 RATIO — SIGNIFICANT CHANGE UP (ref 0.88–1.16)
LYMPHOCYTES # BLD AUTO: 2.31 K/UL — SIGNIFICANT CHANGE UP (ref 1–3.3)
LYMPHOCYTES # BLD AUTO: 23.5 % — SIGNIFICANT CHANGE UP (ref 13–44)
MCHC RBC-ENTMCNC: 28.9 PG — SIGNIFICANT CHANGE UP (ref 27–34)
MCHC RBC-ENTMCNC: 32 GM/DL — SIGNIFICANT CHANGE UP (ref 32–36)
MCV RBC AUTO: 90.5 FL — SIGNIFICANT CHANGE UP (ref 80–100)
MONOCYTES # BLD AUTO: 0.77 K/UL — SIGNIFICANT CHANGE UP (ref 0–0.9)
MONOCYTES NFR BLD AUTO: 7.8 % — SIGNIFICANT CHANGE UP (ref 2–14)
NEUTROPHILS # BLD AUTO: 6.28 K/UL — SIGNIFICANT CHANGE UP (ref 1.8–7.4)
NEUTROPHILS NFR BLD AUTO: 63.9 % — SIGNIFICANT CHANGE UP (ref 43–77)
NRBC # BLD: 0 /100 WBCS — SIGNIFICANT CHANGE UP (ref 0–0)
PLATELET # BLD AUTO: 153 K/UL — SIGNIFICANT CHANGE UP (ref 150–400)
PROTHROM AB SERPL-ACNC: 12.4 SEC — SIGNIFICANT CHANGE UP (ref 10–12.9)
RBC # BLD: 4.32 M/UL — SIGNIFICANT CHANGE UP (ref 3.8–5.2)
RBC # FLD: 13.6 % — SIGNIFICANT CHANGE UP (ref 10.3–14.5)
WBC # BLD: 9.83 K/UL — SIGNIFICANT CHANGE UP (ref 3.8–10.5)
WBC # FLD AUTO: 9.83 K/UL — SIGNIFICANT CHANGE UP (ref 3.8–10.5)

## 2019-12-27 PROCEDURE — 99282 EMERGENCY DEPT VISIT SF MDM: CPT

## 2019-12-27 PROCEDURE — 76819 FETAL BIOPHYS PROFIL W/O NST: CPT | Mod: 26

## 2019-12-27 PROCEDURE — 76815 OB US LIMITED FETUS(S): CPT | Mod: 26

## 2019-12-27 RX ORDER — CITRIC ACID/SODIUM CITRATE 300-500 MG
15 SOLUTION, ORAL ORAL EVERY 6 HOURS
Refills: 0 | Status: DISCONTINUED | OUTPATIENT
Start: 2019-12-27 | End: 2019-12-27

## 2019-12-27 RX ORDER — OXYTOCIN 10 UNIT/ML
333.33 VIAL (ML) INJECTION
Qty: 20 | Refills: 0 | Status: DISCONTINUED | OUTPATIENT
Start: 2019-12-27 | End: 2019-12-31

## 2019-12-27 RX ORDER — SODIUM CHLORIDE 9 MG/ML
1000 INJECTION, SOLUTION INTRAVENOUS
Refills: 0 | Status: DISCONTINUED | OUTPATIENT
Start: 2019-12-27 | End: 2019-12-28

## 2019-12-27 RX ORDER — CEFAZOLIN SODIUM 1 G
2000 VIAL (EA) INJECTION ONCE
Refills: 0 | Status: COMPLETED | OUTPATIENT
Start: 2019-12-27 | End: 2019-12-28

## 2019-12-27 RX ORDER — CITRIC ACID/SODIUM CITRATE 300-500 MG
30 SOLUTION, ORAL ORAL ONCE
Refills: 0 | Status: COMPLETED | OUTPATIENT
Start: 2019-12-27 | End: 2019-12-27

## 2019-12-27 RX ORDER — AMPICILLIN TRIHYDRATE 250 MG
2 CAPSULE ORAL ONCE
Refills: 0 | Status: COMPLETED | OUTPATIENT
Start: 2019-12-27 | End: 2019-12-27

## 2019-12-27 RX ORDER — OXYTOCIN 10 UNIT/ML
2 VIAL (ML) INJECTION
Qty: 30 | Refills: 0 | Status: DISCONTINUED | OUTPATIENT
Start: 2019-12-27 | End: 2019-12-27

## 2019-12-27 RX ORDER — AZITHROMYCIN 500 MG/1
500 TABLET, FILM COATED ORAL ONCE
Refills: 0 | Status: COMPLETED | OUTPATIENT
Start: 2019-12-27 | End: 2019-12-27

## 2019-12-27 RX ORDER — AMPICILLIN TRIHYDRATE 250 MG
1 CAPSULE ORAL EVERY 4 HOURS
Refills: 0 | Status: DISCONTINUED | OUTPATIENT
Start: 2019-12-27 | End: 2019-12-27

## 2019-12-27 RX ADMIN — AZITHROMYCIN 255 MILLIGRAM(S): 500 TABLET, FILM COATED ORAL at 23:17

## 2019-12-27 RX ADMIN — Medication 216 GRAM(S): at 18:16

## 2019-12-27 RX ADMIN — Medication 30 MILLILITER(S): at 23:20

## 2019-12-28 LAB
ABO RH CONFIRMATION: SIGNIFICANT CHANGE UP
BASOPHILS # BLD AUTO: 0.05 K/UL — SIGNIFICANT CHANGE UP (ref 0–0.2)
BASOPHILS NFR BLD AUTO: 0.3 % — SIGNIFICANT CHANGE UP (ref 0–2)
EOSINOPHIL # BLD AUTO: 0.17 K/UL — SIGNIFICANT CHANGE UP (ref 0–0.5)
EOSINOPHIL NFR BLD AUTO: 1.2 % — SIGNIFICANT CHANGE UP (ref 0–6)
FETAL SCREEN: SIGNIFICANT CHANGE UP
HCT VFR BLD CALC: 26.2 % — LOW (ref 34.5–45)
HGB BLD-MCNC: 8.3 G/DL — LOW (ref 11.5–15.5)
IMM GRANULOCYTES NFR BLD AUTO: 2 % — HIGH (ref 0–1.5)
LYMPHOCYTES # BLD AUTO: 15.2 % — SIGNIFICANT CHANGE UP (ref 13–44)
LYMPHOCYTES # BLD AUTO: 2.24 K/UL — SIGNIFICANT CHANGE UP (ref 1–3.3)
MCHC RBC-ENTMCNC: 28.6 PG — SIGNIFICANT CHANGE UP (ref 27–34)
MCHC RBC-ENTMCNC: 31.7 GM/DL — LOW (ref 32–36)
MCV RBC AUTO: 90.3 FL — SIGNIFICANT CHANGE UP (ref 80–100)
MONOCYTES # BLD AUTO: 1.37 K/UL — HIGH (ref 0–0.9)
MONOCYTES NFR BLD AUTO: 9.3 % — SIGNIFICANT CHANGE UP (ref 2–14)
NEUTROPHILS # BLD AUTO: 10.59 K/UL — HIGH (ref 1.8–7.4)
NEUTROPHILS NFR BLD AUTO: 72 % — SIGNIFICANT CHANGE UP (ref 43–77)
NRBC # BLD: 0 /100 WBCS — SIGNIFICANT CHANGE UP (ref 0–0)
PLATELET # BLD AUTO: 125 K/UL — LOW (ref 150–400)
RBC # BLD: 2.9 M/UL — LOW (ref 3.8–5.2)
RBC # FLD: 13.9 % — SIGNIFICANT CHANGE UP (ref 10.3–14.5)
T PALLIDUM AB TITR SER: NEGATIVE — SIGNIFICANT CHANGE UP
WBC # BLD: 14.71 K/UL — HIGH (ref 3.8–10.5)
WBC # FLD AUTO: 14.71 K/UL — HIGH (ref 3.8–10.5)

## 2019-12-28 PROCEDURE — 59514 CESAREAN DELIVERY ONLY: CPT | Mod: U9

## 2019-12-28 PROCEDURE — 88307 TISSUE EXAM BY PATHOLOGIST: CPT | Mod: 26

## 2019-12-28 PROCEDURE — 59514 CESAREAN DELIVERY ONLY: CPT | Mod: AS,U9

## 2019-12-28 RX ORDER — KETOROLAC TROMETHAMINE 30 MG/ML
30 SYRINGE (ML) INJECTION EVERY 6 HOURS
Refills: 0 | Status: DISCONTINUED | OUTPATIENT
Start: 2019-12-28 | End: 2019-12-28

## 2019-12-28 RX ORDER — LANOLIN
1 OINTMENT (GRAM) TOPICAL EVERY 6 HOURS
Refills: 0 | Status: DISCONTINUED | OUTPATIENT
Start: 2019-12-28 | End: 2019-12-31

## 2019-12-28 RX ORDER — FOLIC ACID 0.8 MG
1 TABLET ORAL DAILY
Refills: 0 | Status: DISCONTINUED | OUTPATIENT
Start: 2019-12-28 | End: 2019-12-31

## 2019-12-28 RX ORDER — GLYCERIN ADULT
1 SUPPOSITORY, RECTAL RECTAL AT BEDTIME
Refills: 0 | Status: DISCONTINUED | OUTPATIENT
Start: 2019-12-28 | End: 2019-12-31

## 2019-12-28 RX ORDER — OXYCODONE AND ACETAMINOPHEN 5; 325 MG/1; MG/1
1 TABLET ORAL EVERY 4 HOURS
Refills: 0 | Status: DISCONTINUED | OUTPATIENT
Start: 2019-12-28 | End: 2019-12-31

## 2019-12-28 RX ORDER — OXYTOCIN 10 UNIT/ML
333.33 VIAL (ML) INJECTION
Qty: 20 | Refills: 0 | Status: DISCONTINUED | OUTPATIENT
Start: 2019-12-28 | End: 2019-12-31

## 2019-12-28 RX ORDER — SODIUM CHLORIDE 9 MG/ML
1000 INJECTION, SOLUTION INTRAVENOUS
Refills: 0 | Status: DISCONTINUED | OUTPATIENT
Start: 2019-12-28 | End: 2019-12-29

## 2019-12-28 RX ORDER — HEPARIN SODIUM 5000 [USP'U]/ML
5000 INJECTION INTRAVENOUS; SUBCUTANEOUS EVERY 12 HOURS
Refills: 0 | Status: DISCONTINUED | OUTPATIENT
Start: 2019-12-28 | End: 2019-12-31

## 2019-12-28 RX ORDER — MAGNESIUM HYDROXIDE 400 MG/1
30 TABLET, CHEWABLE ORAL
Refills: 0 | Status: DISCONTINUED | OUTPATIENT
Start: 2019-12-28 | End: 2019-12-31

## 2019-12-28 RX ORDER — TETANUS TOXOID, REDUCED DIPHTHERIA TOXOID AND ACELLULAR PERTUSSIS VACCINE, ADSORBED 5; 2.5; 8; 8; 2.5 [IU]/.5ML; [IU]/.5ML; UG/.5ML; UG/.5ML; UG/.5ML
0.5 SUSPENSION INTRAMUSCULAR ONCE
Refills: 0 | Status: COMPLETED | OUTPATIENT
Start: 2019-12-28

## 2019-12-28 RX ORDER — SIMETHICONE 80 MG/1
80 TABLET, CHEWABLE ORAL EVERY 4 HOURS
Refills: 0 | Status: DISCONTINUED | OUTPATIENT
Start: 2019-12-28 | End: 2019-12-31

## 2019-12-28 RX ORDER — DIPHENHYDRAMINE HCL 50 MG
25 CAPSULE ORAL EVERY 6 HOURS
Refills: 0 | Status: DISCONTINUED | OUTPATIENT
Start: 2019-12-28 | End: 2019-12-31

## 2019-12-28 RX ORDER — FERROUS SULFATE 325(65) MG
325 TABLET ORAL DAILY
Refills: 0 | Status: DISCONTINUED | OUTPATIENT
Start: 2019-12-28 | End: 2019-12-29

## 2019-12-28 RX ORDER — IBUPROFEN 200 MG
600 TABLET ORAL EVERY 6 HOURS
Refills: 0 | Status: DISCONTINUED | OUTPATIENT
Start: 2019-12-28 | End: 2019-12-31

## 2019-12-28 RX ORDER — ACETAMINOPHEN 500 MG
975 TABLET ORAL EVERY 6 HOURS
Refills: 0 | Status: DISCONTINUED | OUTPATIENT
Start: 2019-12-28 | End: 2019-12-31

## 2019-12-28 RX ORDER — ACETAMINOPHEN 500 MG
1000 TABLET ORAL ONCE
Refills: 0 | Status: DISCONTINUED | OUTPATIENT
Start: 2019-12-28 | End: 2019-12-31

## 2019-12-28 RX ADMIN — SODIUM CHLORIDE 125 MILLILITER(S): 9 INJECTION, SOLUTION INTRAVENOUS at 05:10

## 2019-12-28 RX ADMIN — Medication 325 MILLIGRAM(S): at 11:22

## 2019-12-28 RX ADMIN — Medication 1 MILLIGRAM(S): at 11:22

## 2019-12-28 RX ADMIN — SIMETHICONE 80 MILLIGRAM(S): 80 TABLET, CHEWABLE ORAL at 09:55

## 2019-12-28 RX ADMIN — Medication 1000 MILLIUNIT(S)/MIN: at 00:52

## 2019-12-28 RX ADMIN — Medication 100 MILLIGRAM(S): at 00:08

## 2019-12-28 RX ADMIN — Medication 30 MILLIGRAM(S): at 18:36

## 2019-12-28 RX ADMIN — HEPARIN SODIUM 5000 UNIT(S): 5000 INJECTION INTRAVENOUS; SUBCUTANEOUS at 13:18

## 2019-12-28 RX ADMIN — Medication 25 MILLIGRAM(S): at 19:06

## 2019-12-28 RX ADMIN — Medication 1 TABLET(S): at 11:22

## 2019-12-28 RX ADMIN — Medication 30 MILLIGRAM(S): at 19:00

## 2019-12-28 RX ADMIN — Medication 30 MILLIGRAM(S): at 12:30

## 2019-12-28 RX ADMIN — Medication 30 MILLIGRAM(S): at 12:16

## 2019-12-28 RX ADMIN — Medication 25 MILLIGRAM(S): at 09:55

## 2019-12-28 NOTE — CHART NOTE - NSCHARTNOTEFT_GEN_A_CORE
Patient seen and evaluated at bedside, resting comfortably w/o any acute complaints. Tolerant of clear liquid diet.  voiding clear urine into tang. Denies n/v, fever, dizziness, chills, sob, chest pain, or any other concern.    Vital Signs Last 24 Hrs  T(C): 37.2 (28 Dec 2019 10:02), Max: 37.2 (28 Dec 2019 10:02)  T(F): 99 (28 Dec 2019 10:02), Max: 99 (28 Dec 2019 10:02)  HR: 109 (28 Dec 2019 10:02) (94 - 114)  BP: 115/53 (28 Dec 2019 10:02) (110/59 - 133/58)  BP(mean): 73 (28 Dec 2019 03:35) (58 - 78)  RR: 18 (28 Dec 2019 10:02) (13 - 18)  SpO2: 99% (28 Dec 2019 10:02) (95% - 99%)    exam  gen: nad, aa+o x 3  abd: soft, non tender,  fundus firm,  dressing in place, clean, dry and intact  gyn: minimal lochia  ext: venodynes in place    a/p   28 yo  F s/p britney  for cat 2 tracing @ 41weeks, s/p failed miol for oligohydramnios, POD 0, currently stable  --f/u 6pm cbc  --dc tang 12 hour post op  --pain management prn  -- incentive spirometer  -- Venodyne heparin for vte prophylaxis  -- continue post op care  Dr Gil, house attending aware

## 2019-12-29 DIAGNOSIS — D62 ACUTE POSTHEMORRHAGIC ANEMIA: ICD-10-CM

## 2019-12-29 RX ORDER — FERROUS SULFATE 325(65) MG
325 TABLET ORAL
Refills: 0 | Status: DISCONTINUED | OUTPATIENT
Start: 2019-12-29 | End: 2019-12-31

## 2019-12-29 RX ADMIN — Medication 1 MILLIGRAM(S): at 11:06

## 2019-12-29 RX ADMIN — Medication 975 MILLIGRAM(S): at 18:00

## 2019-12-29 RX ADMIN — Medication 600 MILLIGRAM(S): at 02:20

## 2019-12-29 RX ADMIN — HEPARIN SODIUM 5000 UNIT(S): 5000 INJECTION INTRAVENOUS; SUBCUTANEOUS at 15:00

## 2019-12-29 RX ADMIN — HEPARIN SODIUM 5000 UNIT(S): 5000 INJECTION INTRAVENOUS; SUBCUTANEOUS at 01:46

## 2019-12-29 RX ADMIN — Medication 1 TABLET(S): at 11:05

## 2019-12-29 RX ADMIN — Medication 975 MILLIGRAM(S): at 00:47

## 2019-12-29 RX ADMIN — Medication 600 MILLIGRAM(S): at 01:46

## 2019-12-29 RX ADMIN — SIMETHICONE 80 MILLIGRAM(S): 80 TABLET, CHEWABLE ORAL at 17:34

## 2019-12-29 RX ADMIN — SIMETHICONE 80 MILLIGRAM(S): 80 TABLET, CHEWABLE ORAL at 11:06

## 2019-12-29 RX ADMIN — Medication 975 MILLIGRAM(S): at 11:05

## 2019-12-29 RX ADMIN — Medication 975 MILLIGRAM(S): at 17:30

## 2019-12-29 RX ADMIN — Medication 975 MILLIGRAM(S): at 11:35

## 2019-12-29 RX ADMIN — Medication 325 MILLIGRAM(S): at 17:31

## 2019-12-29 RX ADMIN — Medication 600 MILLIGRAM(S): at 20:05

## 2019-12-29 RX ADMIN — Medication 600 MILLIGRAM(S): at 20:35

## 2019-12-29 RX ADMIN — MAGNESIUM HYDROXIDE 30 MILLILITER(S): 400 TABLET, CHEWABLE ORAL at 17:34

## 2019-12-29 RX ADMIN — SIMETHICONE 80 MILLIGRAM(S): 80 TABLET, CHEWABLE ORAL at 00:15

## 2019-12-29 RX ADMIN — Medication 975 MILLIGRAM(S): at 00:13

## 2019-12-29 NOTE — PROGRESS NOTE ADULT - SUBJECTIVE AND OBJECTIVE BOX
Patient seen at bedside resting comfortably, offers no new complaints. ambulating without difficulty, tang removed and voiding without difficulty. + flatus;  no BM; tolerating regular diet. breast feeding. Denies HA, blurry vision or epigastric pain, CP, SOB, N/V/D, dizziness, palpitations, worsening vaginal bleeding.    Vital Signs Last 24 Hrs  T(C): 36.7 (29 Dec 2019 05:00), Max: 37.2 (28 Dec 2019 10:02)  T(F): 98.1 (29 Dec 2019 05:00), Max: 99 (28 Dec 2019 10:02)  HR: 94 (29 Dec 2019 05:00) (94 - 109)  BP: 110/69 (29 Dec 2019 05:00) (101/65 - 126/77)  BP(mean): --  RR: 16 (29 Dec 2019 05:00) (16 - 18)  SpO2: 99% (29 Dec 2019 05:00) (98% - 99%)    Gen: A&O x 3, NAD  Chest: CTA B/L  Cardiac: S1,S2  RRR  Breast: Soft, nontender, nonengorged  Abdomen: +BS; soft; Nontender, nondistended; dressing removed incision C/D/I steri strips in place  Gyn: minimal lochia   Extremities: Nontender, venodynes in place                          8.3    14.71 )-----------( 125      ( 28 Dec 2019 19:27 )             26.2

## 2019-12-30 ENCOUNTER — TRANSCRIPTION ENCOUNTER (OUTPATIENT)
Age: 29
End: 2019-12-30

## 2019-12-30 LAB
ANION GAP SERPL CALC-SCNC: 5 MMOL/L — SIGNIFICANT CHANGE UP (ref 5–17)
BASOPHILS # BLD AUTO: 0.07 K/UL — SIGNIFICANT CHANGE UP (ref 0–0.2)
BASOPHILS NFR BLD AUTO: 0.4 % — SIGNIFICANT CHANGE UP (ref 0–2)
BUN SERPL-MCNC: 5 MG/DL — LOW (ref 7–18)
CALCIUM SERPL-MCNC: 8.6 MG/DL — SIGNIFICANT CHANGE UP (ref 8.4–10.5)
CHLORIDE SERPL-SCNC: 107 MMOL/L — SIGNIFICANT CHANGE UP (ref 96–108)
CO2 SERPL-SCNC: 29 MMOL/L — SIGNIFICANT CHANGE UP (ref 22–31)
CREAT SERPL-MCNC: 0.58 MG/DL — SIGNIFICANT CHANGE UP (ref 0.5–1.3)
EOSINOPHIL # BLD AUTO: 0.45 K/UL — SIGNIFICANT CHANGE UP (ref 0–0.5)
EOSINOPHIL NFR BLD AUTO: 2.8 % — SIGNIFICANT CHANGE UP (ref 0–6)
GLUCOSE SERPL-MCNC: 70 MG/DL — SIGNIFICANT CHANGE UP (ref 70–99)
HCT VFR BLD CALC: 27.8 % — LOW (ref 34.5–45)
HGB BLD-MCNC: 8.7 G/DL — LOW (ref 11.5–15.5)
IMM GRANULOCYTES NFR BLD AUTO: 2.7 % — HIGH (ref 0–1.5)
LYMPHOCYTES # BLD AUTO: 24.4 % — SIGNIFICANT CHANGE UP (ref 13–44)
LYMPHOCYTES # BLD AUTO: 3.95 K/UL — HIGH (ref 1–3.3)
MCHC RBC-ENTMCNC: 28.8 PG — SIGNIFICANT CHANGE UP (ref 27–34)
MCHC RBC-ENTMCNC: 31.3 GM/DL — LOW (ref 32–36)
MCV RBC AUTO: 92.1 FL — SIGNIFICANT CHANGE UP (ref 80–100)
MONOCYTES # BLD AUTO: 1.14 K/UL — HIGH (ref 0–0.9)
MONOCYTES NFR BLD AUTO: 7.1 % — SIGNIFICANT CHANGE UP (ref 2–14)
NEUTROPHILS # BLD AUTO: 10.12 K/UL — HIGH (ref 1.8–7.4)
NEUTROPHILS NFR BLD AUTO: 62.6 % — SIGNIFICANT CHANGE UP (ref 43–77)
NRBC # BLD: 0 /100 WBCS — SIGNIFICANT CHANGE UP (ref 0–0)
PLATELET # BLD AUTO: 163 K/UL — SIGNIFICANT CHANGE UP (ref 150–400)
POTASSIUM SERPL-MCNC: 4.2 MMOL/L — SIGNIFICANT CHANGE UP (ref 3.5–5.3)
POTASSIUM SERPL-SCNC: 4.2 MMOL/L — SIGNIFICANT CHANGE UP (ref 3.5–5.3)
RBC # BLD: 3.02 M/UL — LOW (ref 3.8–5.2)
RBC # FLD: 14 % — SIGNIFICANT CHANGE UP (ref 10.3–14.5)
SODIUM SERPL-SCNC: 141 MMOL/L — SIGNIFICANT CHANGE UP (ref 135–145)
WBC # BLD: 16.16 K/UL — HIGH (ref 3.8–10.5)
WBC # FLD AUTO: 16.16 K/UL — HIGH (ref 3.8–10.5)

## 2019-12-30 RX ADMIN — Medication 325 MILLIGRAM(S): at 17:42

## 2019-12-30 RX ADMIN — Medication 975 MILLIGRAM(S): at 22:10

## 2019-12-30 RX ADMIN — MAGNESIUM HYDROXIDE 30 MILLILITER(S): 400 TABLET, CHEWABLE ORAL at 06:09

## 2019-12-30 RX ADMIN — Medication 1 TABLET(S): at 13:15

## 2019-12-30 RX ADMIN — Medication 975 MILLIGRAM(S): at 21:11

## 2019-12-30 RX ADMIN — Medication 1 MILLIGRAM(S): at 13:15

## 2019-12-30 RX ADMIN — Medication 600 MILLIGRAM(S): at 17:42

## 2019-12-30 RX ADMIN — Medication 325 MILLIGRAM(S): at 06:08

## 2019-12-30 RX ADMIN — Medication 600 MILLIGRAM(S): at 18:12

## 2019-12-30 RX ADMIN — Medication 600 MILLIGRAM(S): at 06:08

## 2019-12-30 RX ADMIN — HEPARIN SODIUM 5000 UNIT(S): 5000 INJECTION INTRAVENOUS; SUBCUTANEOUS at 03:34

## 2019-12-30 RX ADMIN — HEPARIN SODIUM 5000 UNIT(S): 5000 INJECTION INTRAVENOUS; SUBCUTANEOUS at 13:15

## 2019-12-30 RX ADMIN — Medication 600 MILLIGRAM(S): at 06:40

## 2019-12-30 NOTE — PROGRESS NOTE ADULT - SUBJECTIVE AND OBJECTIVE BOX
Patient seen at bedside resting comfortably, offers no new complaints. + Ambulation, + void without difficulty, + flatus;  no BM; tolerating regular diet. both breastfeeding and bottle feeding. Denies HA, blurry vision or epigastric pain, CP, SOB, N/V/D, dizziness, palpitations, worsening vaginal bleeding.    Vital Signs Last 24 Hrs  T(C): 36.7 (30 Dec 2019 05:42), Max: 36.8 (29 Dec 2019 12:09)  T(F): 98 (30 Dec 2019 05:42), Max: 98.2 (29 Dec 2019 12:09)  HR: 99 (30 Dec 2019 05:42) (99 - 102)  BP: 129/77 (30 Dec 2019 05:42) (105/69 - 129/77)  BP(mean): --  RR: 16 (30 Dec 2019 05:42) (16 - 17)  SpO2: 100% (30 Dec 2019 05:42) (97% - 100%)    Gen: A&O x 3, NAD  Chest: CTA B/L  Cardiac: S1, S2, tachycardia, nl rhythm   Breast: Soft, nontender, nonengorged  Abdomen: +BS; soft; Nontender, nondistended, Incision C/D/I, steri strips in place   Gyn: Minimal lochia  Extremities: Nontender, DTRS 2+, no worsening edema                          8.7    16.16 )-----------( 163      ( 30 Dec 2019 07:19 )             27.8

## 2019-12-30 NOTE — DISCHARGE NOTE OB - MATERIALS PROVIDED
Bottle Feeding Log/[facility] Hearing Screen Program/Vaccinations/Breastfeeding Mother’s Support Group Information/Guide to Postpartum Care/Discharge Medication Information for Patients and Families Pocket Guide/[facility] [place] Screening Program/Shaken Baby Prevention Handout/Breastfeeding Guide and Packet/  Immunization Record/Breastfeeding Log/Back To Sleep Handout/Birth Certificate Instructions

## 2019-12-30 NOTE — DISCHARGE NOTE OB - CARE PLAN
Principal Discharge DX:	 delivery delivered  Goal:	follow up with OB in 2 weeks  Assessment and plan of treatment:	Continue breastfeeding.  Motrin as needed for pain.  Ambulate daily.  No heavy lifting or anything per vagina x 6 weeks - no sex, tampons, douching, tub baths, etc.  Follow up in office in 2 weeks for incision check, and then at 6 weeks for postpartum check.  Secondary Diagnosis:	Acute blood loss as cause of postoperative anemia  Goal:	take iron supplements Principal Discharge DX:	 delivery delivered  Goal:	follow up with OB in 2 weeks  Assessment and plan of treatment:	Continue breastfeeding.  Motrin as needed for pain.  Ambulate daily.  No heavy lifting or anything per vagina x 6 weeks - no sex, tampons, douching, tub baths, etc.  Follow up in office in 2 weeks for incision check, and then at 6 weeks for postpartum check.  Secondary Diagnosis:	Acute blood loss as cause of postoperative anemia  Goal:	take iron supplements  Assessment and plan of treatment:	take iron, folic acid, vitamin C, and prenatal vitamins. eat iron fortified food. Please take iron tablets three times daily. Return if any dizziness, shortness of breath, palpitations, chest pain or any other acute symptoms. Please have caution when holding baby to prevent any falls or dizziness with baby in arms.

## 2019-12-30 NOTE — DISCHARGE NOTE OB - CARE PROVIDER_API CALL
Carito Lowry)  Obstetrics and Gynecology  [address] [place] [address]  Phone: [phone]  Fax: [phone]  Follow Up Time:

## 2019-12-30 NOTE — DISCHARGE NOTE OB - PATIENT PORTAL LINK FT
You can access the FollowMyHealth Patient Portal offered by [facility] by registering at the following website: [email or web address]. By joining [employer]’s FollowMyHealth portal, you will also be able to view your health information using other applications (apps) compatible with our system.

## 2019-12-30 NOTE — DISCHARGE NOTE OB - PLAN OF CARE
follow up with OB in 2 weeks Continue breastfeeding.  Motrin as needed for pain.  Ambulate daily.  No heavy lifting or anything per vagina x 6 weeks - no sex, tampons, douching, tub baths, etc.  Follow up in office in 2 weeks for incision check, and then at 6 weeks for postpartum check. take iron supplements take iron, folic acid, vitamin C, and prenatal vitamins. eat iron fortified food. Please take iron tablets three times daily. Return if any dizziness, shortness of breath, palpitations, chest pain or any other acute symptoms. Please have caution when holding baby to prevent any falls or dizziness with baby in arms.

## 2019-12-30 NOTE — DISCHARGE NOTE OB - HOSPITAL COURSE
primary c/s for category II tracing at 41w  blood loss anemia primary c/s for category II tracing at 41w  blood loss anemia   stable for dc home

## 2019-12-31 VITALS
TEMPERATURE: 97 F | RESPIRATION RATE: 18 BRPM | SYSTOLIC BLOOD PRESSURE: 115 MMHG | HEART RATE: 90 BPM | OXYGEN SATURATION: 99 % | DIASTOLIC BLOOD PRESSURE: 68 MMHG

## 2019-12-31 PROCEDURE — 85730 THROMBOPLASTIN TIME PARTIAL: CPT

## 2019-12-31 PROCEDURE — 86780 TREPONEMA PALLIDUM: CPT

## 2019-12-31 PROCEDURE — 76815 OB US LIMITED FETUS(S): CPT

## 2019-12-31 PROCEDURE — 59050 FETAL MONITOR W/REPORT: CPT

## 2019-12-31 PROCEDURE — G0463: CPT

## 2019-12-31 PROCEDURE — 85384 FIBRINOGEN ACTIVITY: CPT

## 2019-12-31 PROCEDURE — 86880 COOMBS TEST DIRECT: CPT

## 2019-12-31 PROCEDURE — 86900 BLOOD TYPING SEROLOGIC ABO: CPT

## 2019-12-31 PROCEDURE — 76819 FETAL BIOPHYS PROFIL W/O NST: CPT

## 2019-12-31 PROCEDURE — 86850 RBC ANTIBODY SCREEN: CPT

## 2019-12-31 PROCEDURE — 86901 BLOOD TYPING SEROLOGIC RH(D): CPT

## 2019-12-31 PROCEDURE — 86870 RBC ANTIBODY IDENTIFICATION: CPT

## 2019-12-31 PROCEDURE — 59025 FETAL NON-STRESS TEST: CPT

## 2019-12-31 PROCEDURE — 86922 COMPATIBILITY TEST ANTIGLOB: CPT

## 2019-12-31 PROCEDURE — 36415 COLL VENOUS BLD VENIPUNCTURE: CPT

## 2019-12-31 PROCEDURE — 88307 TISSUE EXAM BY PATHOLOGIST: CPT

## 2019-12-31 PROCEDURE — 85027 COMPLETE CBC AUTOMATED: CPT

## 2019-12-31 PROCEDURE — 80048 BASIC METABOLIC PNL TOTAL CA: CPT

## 2019-12-31 PROCEDURE — 85461 HEMOGLOBIN FETAL: CPT

## 2019-12-31 PROCEDURE — 85610 PROTHROMBIN TIME: CPT

## 2019-12-31 PROCEDURE — 90715 TDAP VACCINE 7 YRS/> IM: CPT

## 2019-12-31 RX ORDER — FERROUS SULFATE 325(65) MG
0 TABLET ORAL
Qty: 0 | Refills: 0 | DISCHARGE

## 2019-12-31 RX ORDER — FERROUS SULFATE 325(65) MG
1 TABLET ORAL
Qty: 60 | Refills: 3
Start: 2019-12-31 | End: 2020-04-28

## 2019-12-31 RX ORDER — IBUPROFEN 200 MG
1 TABLET ORAL
Qty: 40 | Refills: 2
Start: 2019-12-31 | End: 2020-01-29

## 2019-12-31 RX ORDER — TETANUS TOXOID, REDUCED DIPHTHERIA TOXOID AND ACELLULAR PERTUSSIS VACCINE, ADSORBED 5; 2.5; 8; 8; 2.5 [IU]/.5ML; [IU]/.5ML; UG/.5ML; UG/.5ML; UG/.5ML
0.5 SUSPENSION INTRAMUSCULAR ONCE
Refills: 0 | Status: COMPLETED | OUTPATIENT
Start: 2019-12-31 | End: 2019-12-31

## 2019-12-31 RX ORDER — DOCUSATE SODIUM 100 MG
1 CAPSULE ORAL
Qty: 60 | Refills: 0
Start: 2019-12-31 | End: 2020-01-29

## 2019-12-31 RX ADMIN — Medication 975 MILLIGRAM(S): at 03:40

## 2019-12-31 RX ADMIN — Medication 975 MILLIGRAM(S): at 02:48

## 2019-12-31 RX ADMIN — Medication 325 MILLIGRAM(S): at 05:42

## 2019-12-31 RX ADMIN — Medication 600 MILLIGRAM(S): at 00:16

## 2019-12-31 RX ADMIN — MAGNESIUM HYDROXIDE 30 MILLILITER(S): 400 TABLET, CHEWABLE ORAL at 00:20

## 2019-12-31 RX ADMIN — Medication 600 MILLIGRAM(S): at 01:10

## 2019-12-31 RX ADMIN — HEPARIN SODIUM 5000 UNIT(S): 5000 INJECTION INTRAVENOUS; SUBCUTANEOUS at 02:49

## 2019-12-31 RX ADMIN — Medication 600 MILLIGRAM(S): at 06:30

## 2019-12-31 RX ADMIN — TETANUS TOXOID, REDUCED DIPHTHERIA TOXOID AND ACELLULAR PERTUSSIS VACCINE, ADSORBED 0.5 MILLILITER(S): 5; 2.5; 8; 8; 2.5 SUSPENSION INTRAMUSCULAR at 05:43

## 2019-12-31 RX ADMIN — Medication 600 MILLIGRAM(S): at 05:30

## 2019-12-31 NOTE — PROGRESS NOTE ADULT - PROBLEM SELECTOR PLAN 2
-continue iron supplementation
-tachycardia likely d/t anemia   -continue iron supplementation  -hemoglobin improving
iron supplement

## 2019-12-31 NOTE — PROGRESS NOTE ADULT - SUBJECTIVE AND OBJECTIVE BOX
Patient seen at bedside resting comfortably, offers no new complaints. + Ambulation, + void without difficulty, + flatus;  + BM;  tolerating regular diet. Both breastfeeding and bottle feeding. Denies HA, blurry vision or epigastric pain, CP, SOB, N/V/D,  dizziness, palpitations, worsening vaginal bleeding.     Vital Signs Last 24 Hrs  T(C): 36.3 (31 Dec 2019 06:00), Max: 36.9 (30 Dec 2019 17:40)  T(F): 97.4 (31 Dec 2019 06:00), Max: 98.5 (30 Dec 2019 17:40)  HR: 90 (31 Dec 2019 06:00) (90 - 96)  BP: 115/68 (31 Dec 2019 06:00) (115/68 - 115/76)  BP(mean): --  RR: 18 (31 Dec 2019 06:00) (18 - 18)  SpO2: 99% (31 Dec 2019 06:00) (99% - 100%)    Gen: A&O x 3, NAD  Chest: CTA B/L  Cardiac: S1,S2  RRR  Breast: Soft, nontender, nonengorged  Abdomen: +BS; soft; Nontender, nondistended, Incision C/D/I steri strips in place   Gyn: Minimal lochia  Extremities: Nontender, DTRS 2+, no worsening edema                          8.7    16.16 )-----------( 163      ( 30 Dec 2019 07:19 )             27.8 Patient seen at bedside resting comfortably, offers no new complaints. + Ambulation, + void without difficulty, + flatus;  + BM;  tolerating regular diet. Both breastfeeding and bottle feeding. Denies HA, blurry vision or epigastric pain, CP, SOB, N/V/D,  dizziness, palpitations, worsening vaginal bleeding.     Vital Signs Last 24 Hrs  T(C): 36.3 (31 Dec 2019 06:00), Max: 36.9 (30 Dec 2019 17:40)  T(F): 97.4 (31 Dec 2019 06:00), Max: 98.5 (30 Dec 2019 17:40)  HR: 90 (31 Dec 2019 06:00) (90 - 96)  BP: 115/68 (31 Dec 2019 06:00) (115/68 - 115/76)  BP(mean): --  RR: 18 (31 Dec 2019 06:00) (18 - 18)  SpO2: 99% (31 Dec 2019 06:00) (99% - 100%)    Gen: A&O x 3, NAD  Chest: CTA B/L  Cardiac: S1,S2  RRR  Breast: Soft, nontender, nonengorged  Abdomen: +BS; soft; Nontender, nondistended, Incision C/D/I steri strips in place, fundus firm   Gyn: Minimal lochia  Extremities: Nontender, no worsening edema                          8.7    16.16 )-----------( 163      ( 30 Dec 2019 07:19 )             27.8

## 2019-12-31 NOTE — PROGRESS NOTE ADULT - ASSESSMENT
POD #3 s/p c/s at 41w
POD #1 s/p c/s for category II remote at 41 weeks
POD #2 s/p c/s due to category II remote @41w

## 2019-12-31 NOTE — PROGRESS NOTE ADULT - PROBLEM SELECTOR PLAN 1
-d/c home   -instructions verbalized  -follow up in 1-2weeks in office for incision check  -d/w Dr. Melchor     pt seen with BERTHA Haynes
-Pain management as needed  -OOB and ambulate  -f/u Rpt CBC   -Encourage breastfeeding   -d/w Dr. Hernadez
-Pain management as needed  -cont post op care  -OOB and ambulate  - f/u Rpt CBC   -encourage incentive spirometer use  -Encourage breastfeeding   -d/w Dr. Ibarra    pt seen with BERTHA Turner

## 2020-01-09 ENCOUNTER — OUTPATIENT (OUTPATIENT)
Dept: OUTPATIENT SERVICES | Facility: HOSPITAL | Age: 30
LOS: 1 days | End: 2020-01-09
Payer: MEDICAID

## 2020-01-09 ENCOUNTER — APPOINTMENT (OUTPATIENT)
Dept: OBGYN | Facility: CLINIC | Age: 30
End: 2020-01-09
Payer: MEDICAID

## 2020-01-09 VITALS
OXYGEN SATURATION: 100 % | WEIGHT: 162.5 LBS | SYSTOLIC BLOOD PRESSURE: 108 MMHG | TEMPERATURE: 98.4 F | HEART RATE: 76 BPM | HEIGHT: 60 IN | BODY MASS INDEX: 31.9 KG/M2 | DIASTOLIC BLOOD PRESSURE: 68 MMHG

## 2020-01-09 DIAGNOSIS — Z00.00 ENCOUNTER FOR GENERAL ADULT MEDICAL EXAMINATION WITHOUT ABNORMAL FINDINGS: ICD-10-CM

## 2020-01-09 DIAGNOSIS — Z3A.40 40 WEEKS GESTATION OF PREGNANCY: ICD-10-CM

## 2020-01-09 PROCEDURE — G0463: CPT

## 2020-01-09 PROCEDURE — 99213 OFFICE O/P EST LOW 20 MIN: CPT

## 2020-01-09 RX ORDER — PNV NO.95/FERROUS FUM/FOLIC AC 28MG-0.8MG
TABLET ORAL
Refills: 0 | Status: DISCONTINUED | COMMUNITY
End: 2020-01-09

## 2020-01-09 NOTE — HISTORY OF PRESENT ILLNESS
[Fever] : no fever [3/10] : the patient reports pain that is 3/10 in severity [Chills] : no chills [Nausea] : no nausea [Vomiting] : no vomiting [Diarrhea] : no diarrhea [Vaginal Bleeding] : no vaginal bleeding [Pelvic Pressure] : no pelvic pressure [Dysuria] : no dysuria [Clean/Dry/Intact] : clean, dry and intact [Constipation] : no constipation [Vaginal Discharge] : no vaginal discharge [Erythema] : not erythematous [Dehiscence] : not dehisced [Swelling] : not swollen [Discharge] : absent of discharge [None] : no vaginal bleeding [Doing Well] : is doing well [No Sign of Infection] : is showing no signs of infection [Sutures Removed] : sutures were removed [No [place]] : to avoid sexual intercourse [Limited Work] : to work with limitations [No Tampons/Suppositories] : against using tampons or vaginal suppositories [Limited Housework] : to do housework with limitations [No Sports] : not to participate in sports [de-identified] : Post Op Exam; Post-Op Anemia [de-identified] : S/P Primary  for category 2 tracing / (+) Breast & Bottle feeding / Taking Iron qd [de-identified] : Counseled on surgical pain management with use of abdominal binder; Iron BID; PNV daily ; RTO 4 weeks for postpartum visit.

## 2020-01-09 NOTE — HISTORY OF PRESENT ILLNESS
[3/10] : the patient reports pain that is 3/10 in severity [Fever] : no fever [Chills] : no chills [Nausea] : no nausea [Vomiting] : no vomiting [Vaginal Bleeding] : no vaginal bleeding [Diarrhea] : no diarrhea [Dysuria] : no dysuria [Pelvic Pressure] : no pelvic pressure [Constipation] : no constipation [Clean/Dry/Intact] : clean, dry and intact [Vaginal Discharge] : no vaginal discharge [Erythema] : not erythematous [Dehiscence] : not dehisced [Swelling] : not swollen [Discharge] : absent of discharge [None] : no vaginal bleeding [Doing Well] : is doing well [No Sign of Infection] : is showing no signs of infection [Sutures Removed] : sutures were removed [No [place]] : to avoid sexual intercourse [No Tampons/Suppositories] : against using tampons or vaginal suppositories [Limited Work] : to work with limitations [Limited Housework] : to do housework with limitations [No Sports] : not to participate in sports [de-identified] : S/P Primary  for category 2 tracing / (+) Breast & Bottle feeding / Taking Iron qd [de-identified] : Post Op Exam; Post-Op Anemia [de-identified] : Counseled on surgical pain management with use of abdominal binder; Iron BID; PNV daily ; RTO 4 weeks for postpartum visit.

## 2020-01-10 DIAGNOSIS — Z98.891 HISTORY OF UTERINE SCAR FROM PREVIOUS SURGERY: ICD-10-CM

## 2020-01-10 DIAGNOSIS — Z48.89 ENCOUNTER FOR OTHER SPECIFIED SURGICAL AFTERCARE: ICD-10-CM

## 2020-01-10 DIAGNOSIS — Z78.9 OTHER SPECIFIED HEALTH STATUS: ICD-10-CM

## 2020-01-10 DIAGNOSIS — D62 ACUTE POSTHEMORRHAGIC ANEMIA: ICD-10-CM

## 2020-01-24 DIAGNOSIS — Z3A.39 39 WEEKS GESTATION OF PREGNANCY: ICD-10-CM

## 2020-01-24 DIAGNOSIS — O99.213 OBESITY COMPLICATING PREGNANCY, THIRD TRIMESTER: ICD-10-CM

## 2020-01-24 DIAGNOSIS — O47.9 FALSE LABOR, UNSPECIFIED: ICD-10-CM

## 2020-01-24 DIAGNOSIS — O09.33 SUPERVISION OF PREGNANCY WITH INSUFFICIENT ANTENATAL CARE, THIRD TRIMESTER: ICD-10-CM

## 2020-02-06 ENCOUNTER — OUTPATIENT (OUTPATIENT)
Dept: OUTPATIENT SERVICES | Facility: HOSPITAL | Age: 30
LOS: 1 days | End: 2020-02-06
Payer: MEDICAID

## 2020-02-06 ENCOUNTER — APPOINTMENT (OUTPATIENT)
Dept: OBGYN | Facility: CLINIC | Age: 30
End: 2020-02-06
Payer: MEDICAID

## 2020-02-06 VITALS
OXYGEN SATURATION: 99 % | HEART RATE: 75 BPM | WEIGHT: 159 LBS | TEMPERATURE: 98.3 F | BODY MASS INDEX: 31.22 KG/M2 | SYSTOLIC BLOOD PRESSURE: 114 MMHG | DIASTOLIC BLOOD PRESSURE: 73 MMHG | HEIGHT: 60 IN

## 2020-02-06 DIAGNOSIS — D62 ACUTE POSTHEMORRHAGIC ANEMIA: ICD-10-CM

## 2020-02-06 DIAGNOSIS — Z78.9 OTHER SPECIFIED HEALTH STATUS: ICD-10-CM

## 2020-02-06 DIAGNOSIS — Z98.891 HISTORY OF UTERINE SCAR FROM PREVIOUS SURGERY: ICD-10-CM

## 2020-02-06 DIAGNOSIS — Z30.09 ENCOUNTER FOR OTHER GENERAL COUNSELING AND ADVICE ON CONTRACEPTION: ICD-10-CM

## 2020-02-06 DIAGNOSIS — Z48.89 ENCOUNTER FOR OTHER SPECIFIED SURGICAL AFTERCARE: ICD-10-CM

## 2020-02-06 DIAGNOSIS — Z34.00 ENCOUNTER FOR SUPERVISION OF NORMAL FIRST PREGNANCY, UNSPECIFIED TRIMESTER: ICD-10-CM

## 2020-02-06 PROCEDURE — G0463: CPT

## 2020-02-06 PROCEDURE — 36415 COLL VENOUS BLD VENIPUNCTURE: CPT

## 2020-02-06 PROCEDURE — 99213 OFFICE O/P EST LOW 20 MIN: CPT

## 2020-02-06 PROCEDURE — 85027 COMPLETE CBC AUTOMATED: CPT

## 2020-02-06 RX ORDER — PRENATAL VIT NO.130/IRON/FOLIC 27MG-0.8MG
28-0.8 TABLET ORAL
Qty: 30 | Refills: 11 | Status: ACTIVE | COMMUNITY
Start: 2020-01-09 | End: 1900-01-01

## 2020-02-06 NOTE — HISTORY OF PRESENT ILLNESS
[Complications:___] : no complications [BF with Difficulty] : nursing without difficulty [Postpartum Follow Up] : postpartum follow up [Last Pap Date: ___] : Last Pap Date: [unfilled] [Delivery Date: ___] : on [unfilled] [Primary C/S] : delivered by  section [Male] : Delivery History: baby boy [Wt. ___] : weighing [unfilled] [Rhogam] : Rhogam was not administered [Rubella Vaccine] : Rubella vaccine was not administered [Pertussis Vaccine] : Pertussis vaccine was not administered [BTL] : no tubal ligation [Breastfeeding] : currently nursing [Discharge HCT: ___] : hematocrit level was [unfilled] [Discharge HGB: ___] : hemoglobin level was [unfilled] [Resumed Menses] : has not resumed her menses [Resumed [place]] : has not resumed intercourse [Intended Contraception] : Intended Contraception: [Clean/Dry/Intact] : clean, dry and intact [Healed] : healed [Back to Normal] : is back to normal in size [Examination Of The Breasts] : breasts are normal [Awake] : awake [Alert] : alert [Acute Distress] : no acute distress [Soft] : soft [Tender] : non tender [Oriented x3] : oriented to person, place, and time [Suprapubic] : in the suprapubic area [Scar] : a scar was noted [Flat Affect] : affect not flat [Depressed Mood] : not depressed [RRR, No Murmurs] : RRR, no murmurs [Doing Well] : is doing well [CTAB] : CTAB [No Sign of Infection] : is showing no signs of infection [Sutures Removed] : sutures were not removed [Excellent Pain Control] : has excellent pain control [de-identified] : CBC sent today, RTO 2 months for annual gyn exam and Pap/HPV screening; Contraception counseling provided, will consider IUD next visit.  [None] : None [de-identified] : Postpartum Exam; Post op anemia; Contraception counseling provided.

## 2020-02-07 LAB
BASOPHILS # BLD AUTO: 0.04 K/UL — SIGNIFICANT CHANGE UP (ref 0–0.2)
BASOPHILS NFR BLD AUTO: 1 % — SIGNIFICANT CHANGE UP (ref 0–2)
EOSINOPHIL # BLD AUTO: 0.11 K/UL — SIGNIFICANT CHANGE UP (ref 0–0.5)
EOSINOPHIL NFR BLD AUTO: 2.7 % — SIGNIFICANT CHANGE UP (ref 0–6)
HCT VFR BLD CALC: 42.8 % — SIGNIFICANT CHANGE UP (ref 34.5–45)
HGB BLD-MCNC: 13.5 G/DL — SIGNIFICANT CHANGE UP (ref 11.5–15.5)
IMM GRANULOCYTES NFR BLD AUTO: 0 % — SIGNIFICANT CHANGE UP (ref 0–1.5)
LYMPHOCYTES # BLD AUTO: 2.76 K/UL — SIGNIFICANT CHANGE UP (ref 1–3.3)
LYMPHOCYTES # BLD AUTO: 68 % — HIGH (ref 13–44)
MANUAL SMEAR VERIFICATION: SIGNIFICANT CHANGE UP
MCHC RBC-ENTMCNC: 28.3 PG — SIGNIFICANT CHANGE UP (ref 27–34)
MCHC RBC-ENTMCNC: 31.5 GM/DL — LOW (ref 32–36)
MCV RBC AUTO: 89.7 FL — SIGNIFICANT CHANGE UP (ref 80–100)
MONOCYTES # BLD AUTO: 0.23 K/UL — SIGNIFICANT CHANGE UP (ref 0–0.9)
MONOCYTES NFR BLD AUTO: 5.7 % — SIGNIFICANT CHANGE UP (ref 2–14)
NEUTROPHILS # BLD AUTO: 0.92 K/UL — LOW (ref 1.8–7.4)
NEUTROPHILS NFR BLD AUTO: 22.6 % — LOW (ref 43–77)
PLAT MORPH BLD: NORMAL — SIGNIFICANT CHANGE UP
PLATELET # BLD AUTO: 204 K/UL — SIGNIFICANT CHANGE UP (ref 150–400)
RBC # BLD: 4.77 M/UL — SIGNIFICANT CHANGE UP (ref 3.8–5.2)
RBC # FLD: 13 % — SIGNIFICANT CHANGE UP (ref 10.3–14.5)
RBC BLD AUTO: NORMAL — SIGNIFICANT CHANGE UP
WBC # BLD: 4.06 K/UL — SIGNIFICANT CHANGE UP (ref 3.8–10.5)
WBC # FLD AUTO: 4.06 K/UL — SIGNIFICANT CHANGE UP (ref 3.8–10.5)

## 2020-02-17 ENCOUNTER — TRANSCRIPTION ENCOUNTER (OUTPATIENT)
Age: 30
End: 2020-02-17

## 2020-02-20 NOTE — CHART NOTE - NSCHARTNOTEFT_GEN_A_CORE
Late entry chart note:     Pt had a primary c/s on 12/28/2019    "Primary c/s there was no evidence of placental abruption (which was confirmed by pathology)  as per Dr. Ibarra, attending on call"

## 2020-03-09 ENCOUNTER — APPOINTMENT (OUTPATIENT)
Dept: OBGYN | Facility: CLINIC | Age: 30
End: 2020-03-09

## 2022-12-22 NOTE — OB RN PATIENT PROFILE - EDUCATION ON THE POTENTIAL RISKS AND IMPACT OF EARLY USE OF PACIFIERS ON THE ESTABLISHMENT OF BREASTFEEDING
Provider Procedure Text (C): After obtaining clear surgical margins the defect was repaired by another provider. Statement Selected

## 2025-02-11 NOTE — OB RN TRIAGE NOTE - NS_DISPOSITION_OBGYN_ALL_OB
minimum assist (75% patients effort)/moderate assist (50% patients effort) Continue to Observe Admit to Labor and Delivery